# Patient Record
Sex: MALE | Race: WHITE | NOT HISPANIC OR LATINO | Employment: OTHER | URBAN - METROPOLITAN AREA
[De-identification: names, ages, dates, MRNs, and addresses within clinical notes are randomized per-mention and may not be internally consistent; named-entity substitution may affect disease eponyms.]

---

## 2018-09-17 ENCOUNTER — HOSPITAL ENCOUNTER (EMERGENCY)
Facility: HOSPITAL | Age: 33
Discharge: HOME | End: 2018-09-17
Attending: EMERGENCY MEDICINE | Admitting: EMERGENCY MEDICINE

## 2018-09-17 ENCOUNTER — APPOINTMENT (OUTPATIENT)
Dept: CT IMAGING | Facility: HOSPITAL | Age: 33
End: 2018-09-17

## 2018-09-17 VITALS
DIASTOLIC BLOOD PRESSURE: 53 MMHG | SYSTOLIC BLOOD PRESSURE: 92 MMHG | HEIGHT: 68 IN | HEART RATE: 67 BPM | TEMPERATURE: 97.5 F | BODY MASS INDEX: 21.46 KG/M2 | OXYGEN SATURATION: 96 % | WEIGHT: 141.6 LBS | RESPIRATION RATE: 16 BRPM

## 2018-09-17 DIAGNOSIS — R56.9 SEIZURE (HCC): Primary | ICD-10-CM

## 2018-09-17 LAB
ALBUMIN SERPL-MCNC: 3.9 G/DL (ref 3.5–5.2)
ALBUMIN/GLOB SERPL: 1.7 G/DL
ALP SERPL-CCNC: 63 U/L (ref 39–117)
ALT SERPL W P-5'-P-CCNC: 12 U/L (ref 1–41)
ANION GAP SERPL CALCULATED.3IONS-SCNC: 11.5 MMOL/L
AST SERPL-CCNC: 10 U/L (ref 1–40)
BASOPHILS # BLD AUTO: 0.03 10*3/MM3 (ref 0–0.2)
BASOPHILS NFR BLD AUTO: 0.3 % (ref 0–1.5)
BILIRUB SERPL-MCNC: 0.3 MG/DL (ref 0.1–1.2)
BUN BLD-MCNC: 9 MG/DL (ref 6–20)
BUN/CREAT SERPL: 8.5 (ref 7–25)
CALCIUM SPEC-SCNC: 8.2 MG/DL (ref 8.6–10.5)
CHLORIDE SERPL-SCNC: 106 MMOL/L (ref 98–107)
CO2 SERPL-SCNC: 23.5 MMOL/L (ref 22–29)
CREAT BLD-MCNC: 1.06 MG/DL (ref 0.76–1.27)
DEPRECATED RDW RBC AUTO: 37.9 FL (ref 37–54)
EOSINOPHIL # BLD AUTO: 0.24 10*3/MM3 (ref 0–0.7)
EOSINOPHIL NFR BLD AUTO: 2.6 % (ref 0.3–6.2)
ERYTHROCYTE [DISTWIDTH] IN BLOOD BY AUTOMATED COUNT: 12.1 % (ref 11.5–14.5)
GFR SERPL CREATININE-BSD FRML MDRD: 82 ML/MIN/1.73
GLOBULIN UR ELPH-MCNC: 2.3 GM/DL
GLUCOSE BLD-MCNC: 106 MG/DL (ref 65–99)
HCT VFR BLD AUTO: 43.4 % (ref 40.4–52.2)
HGB BLD-MCNC: 14.8 G/DL (ref 13.7–17.6)
IMM GRANULOCYTES # BLD: 0 10*3/MM3 (ref 0–0.03)
IMM GRANULOCYTES NFR BLD: 0 % (ref 0–0.5)
LYMPHOCYTES # BLD AUTO: 2.31 10*3/MM3 (ref 0.9–4.8)
LYMPHOCYTES NFR BLD AUTO: 25 % (ref 19.6–45.3)
MCH RBC QN AUTO: 29.4 PG (ref 27–32.7)
MCHC RBC AUTO-ENTMCNC: 34.1 G/DL (ref 32.6–36.4)
MCV RBC AUTO: 86.1 FL (ref 79.8–96.2)
MONOCYTES # BLD AUTO: 0.54 10*3/MM3 (ref 0.2–1.2)
MONOCYTES NFR BLD AUTO: 5.8 % (ref 5–12)
NEUTROPHILS # BLD AUTO: 6.12 10*3/MM3 (ref 1.9–8.1)
NEUTROPHILS NFR BLD AUTO: 66.3 % (ref 42.7–76)
PLATELET # BLD AUTO: 152 10*3/MM3 (ref 140–500)
PMV BLD AUTO: 9.9 FL (ref 6–12)
POTASSIUM BLD-SCNC: 3.7 MMOL/L (ref 3.5–5.2)
PROT SERPL-MCNC: 6.2 G/DL (ref 6–8.5)
RBC # BLD AUTO: 5.04 10*6/MM3 (ref 4.6–6)
SODIUM BLD-SCNC: 141 MMOL/L (ref 136–145)
WBC NRBC COR # BLD: 9.24 10*3/MM3 (ref 4.5–10.7)

## 2018-09-17 PROCEDURE — 80053 COMPREHEN METABOLIC PANEL: CPT | Performed by: EMERGENCY MEDICINE

## 2018-09-17 PROCEDURE — 96365 THER/PROPH/DIAG IV INF INIT: CPT

## 2018-09-17 PROCEDURE — 85025 COMPLETE CBC W/AUTO DIFF WBC: CPT | Performed by: EMERGENCY MEDICINE

## 2018-09-17 PROCEDURE — 93010 ELECTROCARDIOGRAM REPORT: CPT | Performed by: INTERNAL MEDICINE

## 2018-09-17 PROCEDURE — 25010000003 LEVETIRACETAM IN NACL 0.75% 1000 MG/100ML SOLUTION: Performed by: EMERGENCY MEDICINE

## 2018-09-17 PROCEDURE — 70450 CT HEAD/BRAIN W/O DYE: CPT

## 2018-09-17 PROCEDURE — 99285 EMERGENCY DEPT VISIT HI MDM: CPT

## 2018-09-17 PROCEDURE — 93005 ELECTROCARDIOGRAM TRACING: CPT | Performed by: EMERGENCY MEDICINE

## 2018-09-17 PROCEDURE — 72125 CT NECK SPINE W/O DYE: CPT

## 2018-09-17 RX ORDER — SODIUM CHLORIDE 0.9 % (FLUSH) 0.9 %
10 SYRINGE (ML) INJECTION AS NEEDED
Status: DISCONTINUED | OUTPATIENT
Start: 2018-09-17 | End: 2018-09-17 | Stop reason: HOSPADM

## 2018-09-17 RX ORDER — LEVETIRACETAM 500 MG/1
500 TABLET ORAL 2 TIMES DAILY
Qty: 60 TABLET | Refills: 0 | Status: SHIPPED | OUTPATIENT
Start: 2018-09-17

## 2018-09-17 RX ORDER — LEVETIRACETAM 10 MG/ML
1000 INJECTION INTRAVASCULAR ONCE
Status: COMPLETED | OUTPATIENT
Start: 2018-09-17 | End: 2018-09-17

## 2018-09-17 RX ADMIN — LEVETIRACETAM 1000 MG: 10 INJECTION INTRAVENOUS at 04:28

## 2018-09-17 NOTE — ED PROVIDER NOTES
EMERGENCY DEPARTMENT ENCOUNTER    CHIEF COMPLAINT  Chief Complaint: Seizures   History given by: patient, family  History limited by: n/a  Room Number: 15/15  PMD: Provider, No Known      HPI:  Pt is a 32 y.o. male who presents for evaluation after a seizure tonight. Family states that the heard the pt fall. When they checked on the pt, he was seizing. EMS gave the pt Versed prior to arrival. Family states that the pt has a hx of seizures. Currently, pt complains of neck pain and a headache.      Duration:  Prior to arrival  Onset: sudden  Timing: single episode  Location: neuro  Radiation: n/a  Quality: seizure   Intensity/Severity: moderate  Associated Symptoms: neck pain, headache  Aggravating Factors: none  Alleviating Factors: none  Previous Episodes: hx of seizures   Treatment before arrival: Versed    PAST MEDICAL HISTORY  Active Ambulatory Problems     Diagnosis Date Noted   • No Active Ambulatory Problems     Resolved Ambulatory Problems     Diagnosis Date Noted   • No Resolved Ambulatory Problems     Past Medical History:   Diagnosis Date   • Anxiety    • PTSD (post-traumatic stress disorder)    • Seizures (CMS/HCC)        PAST SURGICAL HISTORY  Past Surgical History:   Procedure Laterality Date   • JOINT REPLACEMENT         FAMILY HISTORY  History reviewed. No pertinent family history.    SOCIAL HISTORY  Social History     Social History   • Marital status:      Spouse name: N/A   • Number of children: N/A   • Years of education: N/A     Occupational History   • Not on file.     Social History Main Topics   • Smoking status: Current Some Day Smoker     Types: Cigars   • Smokeless tobacco: Not on file   • Alcohol use No   • Drug use: No   • Sexual activity: Not on file     Other Topics Concern   • Not on file     Social History Narrative   • No narrative on file       ALLERGIES  Patient has no known allergies.    REVIEW OF SYSTEMS  Review of Systems   Constitutional: Negative for activity change,  appetite change and fever.   HENT: Negative for congestion and sore throat.    Eyes: Negative.    Respiratory: Negative for cough and shortness of breath.    Cardiovascular: Negative for chest pain and leg swelling.   Gastrointestinal: Negative for abdominal pain, diarrhea and vomiting.   Endocrine: Negative.    Genitourinary: Negative for decreased urine volume and dysuria.   Musculoskeletal: Positive for neck pain.   Skin: Negative for rash and wound.   Allergic/Immunologic: Negative.    Neurological: Positive for seizures and headaches. Negative for weakness and numbness.   Hematological: Negative.    Psychiatric/Behavioral: Negative.    All other systems reviewed and are negative.      PHYSICAL EXAM  ED Triage Vitals [09/17/18 0257]   Temp Heart Rate Resp BP SpO2   97.7 °F (36.5 °C) 79 16 110/65 98 %      Temp src Heart Rate Source Patient Position BP Location FiO2 (%)   Oral -- -- -- --       Physical Exam   Constitutional: No distress.   HENT:   Head: Normocephalic and atraumatic.   Eyes: Pupils are equal, round, and reactive to light. EOM are normal.   Neck: Normal range of motion. Neck supple. Spinous process tenderness present.   Cardiovascular: Normal rate, regular rhythm and normal heart sounds.    Pulmonary/Chest: Effort normal and breath sounds normal. No respiratory distress.   Abdominal: Soft. There is no tenderness. There is no rebound and no guarding.   Musculoskeletal: Normal range of motion. He exhibits no edema.   Neurological: He is alert. He has normal sensation and normal strength.   Groggy secondary to post-ictal and benzodiazepines.    Skin: Skin is warm and dry.   Nursing note and vitals reviewed.      LAB RESULTS  Lab Results (last 24 hours)     Procedure Component Value Units Date/Time    CBC & Differential [[id number]] Collected:  09/17/18 0318    Specimen:  Blood Updated:  09/17/18 0333    Narrative:       The following orders were created for panel order CBC & Differential.  Procedure                                Abnormality         Status                     ---------                               -----------         ------                     CBC Auto Differential[[id number]]        Normal              Final result                 Please view results for these tests on the individual orders.    Comprehensive Metabolic Panel [[id number]]  (Abnormal) Collected:  09/17/18 0318    Specimen:  Blood Updated:  09/17/18 0348     Glucose 106 (H) mg/dL      BUN 9 mg/dL      Creatinine 1.06 mg/dL      Sodium 141 mmol/L      Potassium 3.7 mmol/L      Chloride 106 mmol/L      CO2 23.5 mmol/L      Calcium 8.2 (L) mg/dL      Total Protein 6.2 g/dL      Albumin 3.90 g/dL      ALT (SGPT) 12 U/L      AST (SGOT) 10 U/L      Alkaline Phosphatase 63 U/L      Total Bilirubin 0.3 mg/dL      eGFR Non African Amer 82 mL/min/1.73      Globulin 2.3 gm/dL      A/G Ratio 1.7 g/dL      BUN/Creatinine Ratio 8.5     Anion Gap 11.5 mmol/L     CBC Auto Differential [[id number]]  (Normal) Collected:  09/17/18 0318    Specimen:  Blood Updated:  09/17/18 0333     WBC 9.24 10*3/mm3      RBC 5.04 10*6/mm3      Hemoglobin 14.8 g/dL      Hematocrit 43.4 %      MCV 86.1 fL      MCH 29.4 pg      MCHC 34.1 g/dL      RDW 12.1 %      RDW-SD 37.9 fl      MPV 9.9 fL      Platelets 152 10*3/mm3      Neutrophil % 66.3 %      Lymphocyte % 25.0 %      Monocyte % 5.8 %      Eosinophil % 2.6 %      Basophil % 0.3 %      Immature Grans % 0.0 %      Neutrophils, Absolute 6.12 10*3/mm3      Lymphocytes, Absolute 2.31 10*3/mm3      Monocytes, Absolute 0.54 10*3/mm3      Eosinophils, Absolute 0.24 10*3/mm3      Basophils, Absolute 0.03 10*3/mm3      Immature Grans, Absolute 0.00 10*3/mm3           I ordered the above labs and reviewed the results    RADIOLOGY  CT Cervical Spine Without Contrast   Final Result   1. No acute cervical spine abnormality       This report was finalized on 9/17/2018 3:56 AM by Be Spaulding M.D.          CT Head Without  Contrast   Final Result   1. No acute intracranial abnormality.                           This report was finalized on 9/17/2018 3:48 AM by Be Spaulding M.D.               I ordered the above noted radiological studies. Interpreted by radiologist. Reviewed by me in PACS.       PROCEDURES  Procedures    EKG           EKG time: 03;22  Rhythm/Rate: NSR 68  P waves and PA: nml  QRS, axis: nml   ST and T waves: nml    Interpreted Contemporaneously by me, independently viewed  No prior     PROGRESS AND CONSULTS       02:56  CMP, CBC, and UDS ordered.     03:04  BP- 110/65 HR- 79 Temp- 97.7 °F (36.5 °C) (Oral) O2 sat- 98%  Informed pt of the plan for labs and CT head. Pt understands and agrees with the plan, all questions answered.    03:10  EKG, CT head, and CT cervical spine ordered.     04:28  Keppra ordered to prevent further seizures.     06:05  BP- 95/54 HR- 78 Temp- 97.7 °F (36.5 °C) (Oral) O2 sat- 95%  Rechecked the patient who is in NAD and is resting comfortably. Informed pt and family that the CT head and CT cervical spine show NAD. Labs are unremarkable. Informed them of the plan for the pt to start Keppra as an outpatient. Informed them of the plan for d/c, pt is to f/u with his PMD. Pt understands and agrees with the plan, all questions answered.    MEDICAL DECISION MAKING  Results were reviewed/discussed with the patient and they were also made aware of online access. Pt also made aware that some labs, such as cultures, will not be resulted during ER visit and follow up with PMD is necessary.     MDM  Number of Diagnoses or Management Options     Amount and/or Complexity of Data Reviewed  Clinical lab tests: ordered and reviewed (WBC=9.24)  Tests in the radiology section of CPT®: ordered and reviewed (CT head and CT cervical spine show NAD. )  Tests in the medicine section of CPT®: ordered and reviewed (See EKG procedure note. )  Decide to obtain previous medical records or to obtain history from someone  other than the patient: yes  Review and summarize past medical records: yes (No prior ED visits. )    Patient Progress  Patient progress: stable         DIAGNOSIS  Final diagnoses:   Seizure (CMS/HCC)       DISPOSITION  DISCHARGE    Patient discharged in stable condition.    Reviewed implications of results, diagnosis, meds, responsibility to follow up, warning signs and symptoms of possible worsening, potential complications and reasons to return to ER,.    Patient/Family voiced understanding of above instructions.    Discussed plan for discharge, as there is no emergent indication for admission. Patient referred to primary care provider for BP management due to today's BP. Pt/family is agreeable and understands need for follow up and repeat testing.  Pt is aware that discharge does not mean that nothing is wrong but it indicates no emergency is present that requires admission and they must continue care with follow-up as given below or physician of their choice.     FOLLOW-UP  [facility] REFERRAL SERVICE  [place] [address]  [phone]  Schedule an appointment as soon as possible for a visit            Medication List      New Prescriptions    levETIRAcetam 500 MG tablet  Commonly known as:  KEPPRA  Take 1 tablet by mouth 2 (Two) Times a Day.          Latest Documented Vital Signs:  As of 6:57 AM  BP- 92/53 HR- 67 Temp- 97.5 °F (36.4 °C) (Oral) O2 sat- 96%    --  Documentation assistance provided by kimberly Oviedo for Dr Coleman.  Information recorded by the scribe was done at my direction and has been verified and validated by me.     Mag Oviedo  09/17/18 0627       Paul Coleman MD  09/17/18 0657

## 2018-09-17 NOTE — ED NOTES
Got up from sleep and had a seizure.  Last seizure 4 years ago.     Geno Rondon, RN  09/17/18 0253

## 2018-10-18 ENCOUNTER — OFFICE VISIT (OUTPATIENT)
Dept: NEUROLOGY | Facility: CLINIC | Age: 33
End: 2018-10-18

## 2018-10-18 VITALS
OXYGEN SATURATION: 96 % | HEART RATE: 95 BPM | DIASTOLIC BLOOD PRESSURE: 60 MMHG | WEIGHT: 143 LBS | SYSTOLIC BLOOD PRESSURE: 120 MMHG | BODY MASS INDEX: 21.67 KG/M2 | HEIGHT: 68 IN

## 2018-10-18 DIAGNOSIS — R56.9 GENERALIZED CONVULSIVE SEIZURES (HCC): Primary | ICD-10-CM

## 2018-10-18 PROCEDURE — 99204 OFFICE O/P NEW MOD 45 MIN: CPT | Performed by: PSYCHIATRY & NEUROLOGY

## 2018-10-18 NOTE — PROGRESS NOTES
Anshu Tadeo is a 32 y.o. male presents for   Chief Complaint   Patient presents with   • Seizures             CHIEF COMPLAINT:  New onset seziure: referral from ED  History of Present Illness    A 32 YOM, right handed [place] referred from the ED with new onset seizure  He is here alone  History reviewed with him: he does not remember much except what he had been told that he fell to the ground at 2 AM, parents heard the thud and he was on the floor having a generalized seizure. He thinks it lasted about 15 minutes. He did not remember much for a long period of time. He does not know for how long he was confused.  He was brought to ED where blood work and CT brain and c-spine were done and were non remarkable.  He was placed don Keppra 500 mg bid and sen tout to follow with Neurology    Comprehensive review is negative for Seizure risk factors except he served in the [employer] for 9+ years and has had few traumatic events with PTSD  but no known direct head injury  No drug use  No family history  No medications as culprits  No provocative factors to comprehensive review      Neuro review else negative    I confirmed ROS, PMH, SH, FH and medications.  The following portions of the patient's history were reviewed and updated as appropriate: allergies, current medications, past family history, past medical history, past social history, past surgical history and problem list.        Review of Systems   Constitutional: Positive for fatigue. Negative for activity change, appetite change, chills, diaphoresis, fever and unexpected weight change.   HENT: Positive for tinnitus. Negative for congestion, dental problem, drooling, ear discharge, ear pain, facial swelling, hearing loss, mouth sores, nosebleeds, postnasal drip, rhinorrhea, sinus pain, sinus pressure, sneezing, sore throat and trouble swallowing.    Eyes: Positive for photophobia. Negative for pain, discharge, redness, itching and visual disturbance.   Respiratory:  Negative.    Cardiovascular: Negative.    Gastrointestinal: Negative.    Endocrine: Negative.    Genitourinary: Negative.    Musculoskeletal: Negative.    Skin: Negative.    Allergic/Immunologic: Negative.    Neurological: Positive for seizures, facial asymmetry and headaches. Negative for dizziness, tremors, syncope, speech difficulty, weakness, light-headedness and numbness.   Hematological: Negative.    Psychiatric/Behavioral: Negative.          Past Medical History:   Diagnosis Date   • Anxiety    • PTSD (post-traumatic stress disorder)    • Seizures (CMS/HCC)    • Syncope          Social History     Social History   • Marital status:      Spouse name: N/A   • Number of children: N/A   • Years of education: N/A     Occupational History   • Not on file.     Social History Main Topics   • Smoking status: Former Smoker     Types: Cigars   • Smokeless tobacco: Not on file   • Alcohol use No   • Drug use: No   • Sexual activity: Not on file     Other Topics Concern   • Not on file     Social History Narrative   • No narrative on file          Family History   Problem Relation Age of Onset   • Diabetes Mother    • Migraines Mother    • Diabetes Father    • Kidney disease Father    • Dementia Maternal Grandmother            Current Outpatient Prescriptions:   •  HYDROXYZINE HCL PO, Take  by mouth., Disp: , Rfl:   •  levETIRAcetam (KEPPRA) 500 MG tablet, Take 1 tablet by mouth 2 (Two) Times a Day., Disp: 60 tablet, Rfl: 0  •  MIRTAZAPINE PO, Take  by mouth., Disp: , Rfl:   •  PRAZOSIN HCL PO, Take  by mouth., Disp: , Rfl:       Vitals:    10/18/18 1529   BP: 120/60   Pulse: 95   SpO2: 96%         Physical Exam   Constitutional: He is oriented to person, place, and time. He appears well-developed and well-nourished. No distress.   HENT:   Head: Normocephalic.   Mouth/Throat: Oropharynx is clear and moist. No oropharyngeal exudate.   Eyes: Pupils are equal, round, and reactive to light. EOM are normal. Right eye  exhibits no discharge. Left eye exhibits no discharge. No scleral icterus.   Neck: Normal range of motion.   Cardiovascular: Normal rate and regular rhythm.    Pulmonary/Chest: Effort normal and breath sounds normal.   Abdominal: Soft. He exhibits no distension. There is no tenderness.   Neurological: He is oriented to person, place, and time. He has normal strength. He has a normal Finger-Nose-Finger Test, a normal Heel to Shin Test, a normal Romberg Test and a normal Tandem Gait Test. Gait normal.   Reflex Scores:       Tricep reflexes are 1+ on the right side and 1+ on the left side.       Bicep reflexes are 1+ on the right side and 1+ on the left side.       Brachioradialis reflexes are 1+ on the right side and 1+ on the left side.       Patellar reflexes are 1+ on the right side and 1+ on the left side.       Achilles reflexes are 1+ on the right side and 1+ on the left side.  Psychiatric: He has a normal mood and affect. His speech is normal and behavior is normal. Judgment and thought content normal.         Neurologic Exam     Mental Status   Oriented to person, place, and time.   Registration: recalls 3 of 3 objects. Recall at 5 minutes: recalls 3 of 3 objects. Follows 3 step commands.   Attention: normal. Concentration: normal.   Speech: speech is normal   Level of consciousness: alert  Knowledge: consistent with education.   Able to name object. Able to read. Able to repeat. Able to write. Normal comprehension.     Cranial Nerves     CN II   Visual fields full to confrontation.     CN III, IV, VI   Pupils are equal, round, and reactive to light.  Extraocular motions are normal.   Right pupil: Size: 5 mm. Shape: regular. Reactivity: brisk. Consensual response: intact. Accommodation: intact.   Left pupil: Size: 5 mm. Shape: regular. Reactivity: brisk. Consensual response: intact. Accommodation: intact.   CN III: no CN III palsy  CN VI: no CN VI palsy  Nystagmus: none   Diplopia: none  Upgaze:  normal  Downgaze: normal  Conjugate gaze: present    CN V   Facial sensation intact.     CN VII   Facial expression full, symmetric.     CN VIII   CN VIII normal.     CN IX, X   CN IX normal.     CN XI   CN XI normal.     CN XII   CN XII normal.     Motor Exam   Muscle bulk: normal  Overall muscle tone: normal  Right arm pronator drift: absent  Left arm pronator drift: absent  Right leg tone: normal  Left leg tone: normal    Strength   Strength 5/5 throughout.     Sensory Exam   Light touch normal.   Vibration normal.   Proprioception normal.   Pinprick normal.   Graphesthesia: normal  Stereognosis: normal    Gait, Coordination, and Reflexes     Gait  Gait: normal    Coordination   Romberg: negative  Finger to nose coordination: normal  Heel to shin coordination: normal  Tandem walking coordination: normal    Tremor   Resting tremor: absent  Intention tremor: absent  Action tremor: absent    Reflexes   Right brachioradialis: 1+  Left brachioradialis: 1+  Right biceps: 1+  Left biceps: 1+  Right triceps: 1+  Left triceps: 1+  Right patellar: 1+  Left patellar: 1+  Right achilles: 1+  Left achilles: 1+  Right : 1+  Right Freitas: absent  Left Freitas: absent  Right ankle clonus: absent  Left ankle clonus: absent  Right pendular knee jerk: absent  Left pendular knee jerk: absent          Admission on 09/17/2018, Discharged on 09/17/2018   Component Date Value Ref Range Status   • Glucose 09/17/2018 106* 65 - 99 mg/dL Final   • BUN 09/17/2018 9  6 - 20 mg/dL Final   • Creatinine 09/17/2018 1.06  0.76 - 1.27 mg/dL Final   • Sodium 09/17/2018 141  136 - 145 mmol/L Final   • Potassium 09/17/2018 3.7  3.5 - 5.2 mmol/L Final   • Chloride 09/17/2018 106  98 - 107 mmol/L Final   • CO2 09/17/2018 23.5  22.0 - 29.0 mmol/L Final   • Calcium 09/17/2018 8.2* 8.6 - 10.5 mg/dL Final   • Total Protein 09/17/2018 6.2  6.0 - 8.5 g/dL Final   • Albumin 09/17/2018 3.90  3.50 - 5.20 g/dL Final   • ALT (SGPT) 09/17/2018 12  1 - 41 U/L  Final   • AST (SGOT) 09/17/2018 10  1 - 40 U/L Final   • Alkaline Phosphatase 09/17/2018 63  39 - 117 U/L Final   • Total Bilirubin 09/17/2018 0.3  0.1 - 1.2 mg/dL Final   • eGFR Non  Amer 09/17/2018 82  >60 mL/min/1.73 Final   • Globulin 09/17/2018 2.3  gm/dL Final   • A/G Ratio 09/17/2018 1.7  g/dL Final   • BUN/Creatinine Ratio 09/17/2018 8.5  7.0 - 25.0 Final   • Anion Gap 09/17/2018 11.5  mmol/L Final   • WBC 09/17/2018 9.24  4.50 - 10.70 10*3/mm3 Final   • RBC 09/17/2018 5.04  4.60 - 6.00 10*6/mm3 Final   • Hemoglobin 09/17/2018 14.8  13.7 - 17.6 g/dL Final   • Hematocrit 09/17/2018 43.4  40.4 - 52.2 % Final   • MCV 09/17/2018 86.1  79.8 - 96.2 fL Final   • MCH 09/17/2018 29.4  27.0 - 32.7 pg Final   • MCHC 09/17/2018 34.1  32.6 - 36.4 g/dL Final   • RDW 09/17/2018 12.1  11.5 - 14.5 % Final   • RDW-SD 09/17/2018 37.9  37.0 - 54.0 fl Final   • MPV 09/17/2018 9.9  6.0 - 12.0 fL Final   • Platelets 09/17/2018 152  140 - 500 10*3/mm3 Final   • Neutrophil % 09/17/2018 66.3  42.7 - 76.0 % Final   • Lymphocyte % 09/17/2018 25.0  19.6 - 45.3 % Final   • Monocyte % 09/17/2018 5.8  5.0 - 12.0 % Final   • Eosinophil % 09/17/2018 2.6  0.3 - 6.2 % Final   • Basophil % 09/17/2018 0.3  0.0 - 1.5 % Final   • Immature Grans % 09/17/2018 0.0  0.0 - 0.5 % Final   • Neutrophils, Absolute 09/17/2018 6.12  1.90 - 8.10 10*3/mm3 Final   • Lymphocytes, Absolute 09/17/2018 2.31  0.90 - 4.80 10*3/mm3 Final   • Monocytes, Absolute 09/17/2018 0.54  0.20 - 1.20 10*3/mm3 Final   • Eosinophils, Absolute 09/17/2018 0.24  0.00 - 0.70 10*3/mm3 Final   • Basophils, Absolute 09/17/2018 0.03  0.00 - 0.20 10*3/mm3 Final   • Immature Grans, Absolute 09/17/2018 0.00  0.00 - 0.03 10*3/mm3 Final            REVIEW OF STUDIES:  Reviewed reports head CT and c-spine CT and albs from ED    DIAGNOSIS, IMPRESSION AND PLAN:    New onset grand mal seizure w/o provocative factors  Reviewed the Dx  Reviewed DDx of seizures: he ahs none  NO provocative  factors  Reviewed the law and ALL restrictions  Continue Dianna Amado palns on beign cooperative and obedient to the law and no driving for 3 months seizure-free  Needs padmini MRI and EEG and see afterwards.

## 2018-10-31 ENCOUNTER — HOSPITAL ENCOUNTER (OUTPATIENT)
Dept: NEUROLOGY | Facility: HOSPITAL | Age: 33
Discharge: HOME | End: 2018-10-31
Attending: PSYCHIATRY & NEUROLOGY | Admitting: PSYCHIATRY & NEUROLOGY

## 2018-10-31 ENCOUNTER — HOSPITAL ENCOUNTER (OUTPATIENT)
Dept: MRI IMAGING | Facility: HOSPITAL | Age: 33
Discharge: HOME | End: 2018-10-31
Attending: PSYCHIATRY & NEUROLOGY

## 2018-10-31 DIAGNOSIS — R56.9 GENERALIZED CONVULSIVE SEIZURES (HCC): ICD-10-CM

## 2018-10-31 PROCEDURE — 95816 EEG AWAKE AND DROWSY: CPT

## 2018-10-31 PROCEDURE — A9577 INJ MULTIHANCE: HCPCS | Performed by: PSYCHIATRY & NEUROLOGY

## 2018-10-31 PROCEDURE — 70553 MRI BRAIN STEM W/O & W/DYE: CPT

## 2018-10-31 PROCEDURE — 0 GADOBENATE DIMEGLUMINE 529 MG/ML SOLUTION: Performed by: PSYCHIATRY & NEUROLOGY

## 2018-10-31 PROCEDURE — 95816 EEG AWAKE AND DROWSY: CPT | Performed by: PSYCHIATRY & NEUROLOGY

## 2018-10-31 RX ADMIN — GADOBENATE DIMEGLUMINE 13 ML: 529 INJECTION, SOLUTION INTRAVENOUS at 13:23

## 2018-11-01 ENCOUNTER — OFFICE VISIT (OUTPATIENT)
Dept: NEUROLOGY | Facility: CLINIC | Age: 33
End: 2018-11-01

## 2018-11-01 VITALS
HEART RATE: 65 BPM | BODY MASS INDEX: 22.13 KG/M2 | HEIGHT: 68 IN | WEIGHT: 146 LBS | SYSTOLIC BLOOD PRESSURE: 120 MMHG | DIASTOLIC BLOOD PRESSURE: 60 MMHG | OXYGEN SATURATION: 97 %

## 2018-11-01 DIAGNOSIS — R56.9 GENERALIZED CONVULSIVE SEIZURES (HCC): Primary | ICD-10-CM

## 2018-11-01 PROCEDURE — 99213 OFFICE O/P EST LOW 20 MIN: CPT | Performed by: PSYCHIATRY & NEUROLOGY

## 2018-11-01 NOTE — PROGRESS NOTES
"Follow Up for a single unprovoked seizure  No further seizures  He is taking Keppra 500 mg Q12H  No side effects    Interim Neurology review: No changes to report to a detailed review          Vitals:    11/01/18 1307   BP: 120/60   Pulse: 65   SpO2: 97%   Weight: 66.2 kg (146 lb)   Height: 172.7 cm (68\")     Neruo examination was not done    EEG was normal  MRI brain was reviewed by me and there is no pathology to account for the seizure: I concur with official reading      IMPRESSION / PLAN  Single Seizure    I reviewed results with Anshu  I reviewed the AAN recommendations regarding single seizure and reviewed the difference between provoked versus unprovoked seizure  He is a tough  shelly that knows all about sleep deprivation and feels it not to be a factor  By the best we can tell this was unprovoked seizure  We discussed the options of continuing on Keppra versus coming off it and he wants to come off it and take the possible risk of another seizure  Having been fully informed I gave him the plan to cut daily Keppra dose by 250 mg every week until he is off  He will call me if any problems    Total time 15 minutes all in reviewing results and discussing plan/decisions as above        "

## 2021-04-16 ENCOUNTER — BULK ORDERING (OUTPATIENT)
Dept: CASE MANAGEMENT | Facility: OTHER | Age: 36
End: 2021-04-16

## 2021-04-16 DIAGNOSIS — Z23 IMMUNIZATION DUE: ICD-10-CM

## 2023-02-16 ENCOUNTER — TRANSCRIBE ORDERS (OUTPATIENT)
Dept: ADMINISTRATIVE | Age: 38
End: 2023-02-16

## 2023-02-16 DIAGNOSIS — R06.02 SHORTNESS OF BREATH: Primary | ICD-10-CM

## 2023-03-10 ENCOUNTER — HOSPITAL ENCOUNTER (OUTPATIENT)
Dept: PULMONOLOGY | Age: 38
Discharge: HOME | End: 2023-03-10
Payer: COMMERCIAL

## 2023-03-10 ENCOUNTER — HOSPITAL ENCOUNTER (OUTPATIENT)
Age: 38
Discharge: HOME | End: 2023-03-10
Payer: COMMERCIAL

## 2023-03-10 ENCOUNTER — HOSPITAL ENCOUNTER (OUTPATIENT)
Dept: GENERAL RADIOLOGY | Age: 38
Discharge: HOME | End: 2023-03-10
Payer: COMMERCIAL

## 2023-03-10 VITALS — OXYGEN SATURATION: 98 %

## 2023-03-10 DIAGNOSIS — R06.02 SHORTNESS OF BREATH: ICD-10-CM

## 2023-03-10 LAB
DLCO %PRED: 75 %
DLCO PRED: NORMAL
DLCO/VA %PRED: NORMAL
DLCO/VA PRED: NORMAL
DLCO/VA: NORMAL
DLCO: NORMAL
EXPIRATORY TIME-POST: NORMAL
EXPIRATORY TIME: NORMAL
FEF 25-75% %CHNG: NORMAL
FEF 25-75% %PRED-POST: NORMAL
FEF 25-75% %PRED-PRE: NORMAL
FEF 25-75% PRED: NORMAL
FEF 25-75%-POST: NORMAL
FEF 25-75%-PRE: NORMAL
FEV1 %PRED-POST: 101 %
FEV1 %PRED-PRE: 92 %
FEV1 PRED: NORMAL
FEV1-POST: NORMAL
FEV1-PRE: NORMAL
FEV1/FVC %PRED-POST: NORMAL
FEV1/FVC %PRED-PRE: NORMAL
FEV1/FVC PRED: NORMAL
FEV1/FVC-POST: 74 %
FEV1/FVC-PRE: 68 %
FVC %PRED-POST: NORMAL
FVC %PRED-PRE: NORMAL
FVC PRED: NORMAL
FVC-POST: NORMAL
FVC-PRE: NORMAL
GAW %PRED: NORMAL
GAW PRED: NORMAL
GAW: NORMAL
IC %PRED: NORMAL
IC PRED: NORMAL
IC: NORMAL
MEP: NORMAL
MIP: NORMAL
MVV %PRED-PRE: NORMAL
MVV PRED: NORMAL
MVV-PRE: NORMAL
PEF %PRED-POST: NORMAL
PEF %PRED-PRE: NORMAL
PEF PRED: NORMAL
PEF%CHNG: NORMAL
PEF-POST: NORMAL
PEF-PRE: NORMAL
RAW %PRED: NORMAL
RAW PRED: NORMAL
RAW: NORMAL
RV %PRED: NORMAL
RV PRED: NORMAL
RV: NORMAL
SVC %PRED: NORMAL
SVC PRED: NORMAL
SVC: NORMAL
TLC %PRED: 94 %
TLC PRED: NORMAL
TLC: NORMAL
VA %PRED: NORMAL
VA PRED: NORMAL
VA: NORMAL
VTG %PRED: NORMAL
VTG PRED: NORMAL
VTG: NORMAL

## 2023-03-10 PROCEDURE — 94060 EVALUATION OF WHEEZING: CPT

## 2023-03-10 PROCEDURE — 6370000000 HC RX 637 (ALT 250 FOR IP): Performed by: PHYSICAL MEDICINE & REHABILITATION

## 2023-03-10 PROCEDURE — 71046 X-RAY EXAM CHEST 2 VIEWS: CPT

## 2023-03-10 PROCEDURE — 94760 N-INVAS EAR/PLS OXIMETRY 1: CPT

## 2023-03-10 PROCEDURE — 94729 DIFFUSING CAPACITY: CPT

## 2023-03-10 PROCEDURE — 94640 AIRWAY INHALATION TREATMENT: CPT

## 2023-03-10 PROCEDURE — 94726 PLETHYSMOGRAPHY LUNG VOLUMES: CPT

## 2023-03-10 RX ORDER — ALBUTEROL SULFATE 90 UG/1
4 AEROSOL, METERED RESPIRATORY (INHALATION) ONCE
Status: COMPLETED | OUTPATIENT
Start: 2023-03-10 | End: 2023-03-10

## 2023-03-10 RX ADMIN — Medication 4 PUFF: at 08:20

## 2023-03-10 ASSESSMENT — PULMONARY FUNCTION TESTS
FEV1/FVC_POST: 74
FEV1_PERCENT_PREDICTED_POST: 101
FEV1/FVC_PRE: 68
FEV1_PERCENT_PREDICTED_PRE: 92

## 2023-03-11 NOTE — PROCEDURES
[address]                               PULMONARY FUNCTION    PATIENT NAME: Pati Barba                        :        1985  MED REC NO:   [id number]                          ROOM:  ACCOUNT NO:   [de-identified]                           ADMIT DATE: 03/10/2023  PROVIDER:     Danny Damon MD    DATE OF PROCEDURE:  03/10/2023    REQUESTING PHYSICIAN:  Dr. Haroon Diaz _____    INDICATION:  Shortness of breath. FINDINGS:  1. Spirometry: The FVC is 109% of predicted. The FEV1 is 92% of  predicted. The FEV1/FVC ratio is 0.68. There was not a statistically  significant response to bronchodilators. 2.  Plethysmography:  The total lung capacity is 94% of predicted. 3.  The diffusion capacity of carbon dioxide is within normal limits. 4.  The flow volume loop is within normal limits. IMPRESSION:  This patient does not have an obstructive or restrictive  defect and the DLCO is within normal limits. These results do not  explain the patient's symptoms and clinical correlation is recommended.         Connie Veliz MD    D: 2023 15:16:47       T: 2023 15:19:08     [email or web address]  Job#: [id number]     Doc#: [id number]    CC:

## 2024-10-07 ENCOUNTER — HOSPITAL ENCOUNTER (OUTPATIENT)
Dept: MAMMOGRAPHY | Age: 39
Discharge: HOME | End: 2024-10-07
Payer: COMMERCIAL

## 2024-10-07 ENCOUNTER — HOSPITAL ENCOUNTER (OUTPATIENT)
Dept: ULTRASOUND IMAGING | Age: 39
Discharge: HOME | End: 2024-10-07
Payer: COMMERCIAL

## 2024-10-07 DIAGNOSIS — N63.0 MASS OF BREAST, UNSPECIFIED LATERALITY: ICD-10-CM

## 2024-10-07 PROCEDURE — 76642 ULTRASOUND BREAST LIMITED: CPT

## 2024-10-07 PROCEDURE — G0279 TOMOSYNTHESIS, MAMMO: HCPCS

## 2025-08-29 ENCOUNTER — OFFICE VISIT (OUTPATIENT)
Dept: FAMILY MEDICINE CLINIC | Age: 40
End: 2025-08-29
Payer: OTHER GOVERNMENT

## 2025-08-29 VITALS
BODY MASS INDEX: 22.94 KG/M2 | DIASTOLIC BLOOD PRESSURE: 76 MMHG | WEIGHT: 151.4 LBS | HEIGHT: 68 IN | SYSTOLIC BLOOD PRESSURE: 110 MMHG | OXYGEN SATURATION: 97 % | HEART RATE: 102 BPM

## 2025-08-29 DIAGNOSIS — F41.0 PANIC DISORDER: ICD-10-CM

## 2025-08-29 DIAGNOSIS — F43.10 POSTTRAUMATIC STRESS DISORDER: ICD-10-CM

## 2025-08-29 DIAGNOSIS — F34.1 DYSTHYMIA: ICD-10-CM

## 2025-08-29 DIAGNOSIS — F41.1 ANXIETY STATE: ICD-10-CM

## 2025-08-29 DIAGNOSIS — F60.81 NARCISSISTIC PERSONALITY DISORDER (HCC): ICD-10-CM

## 2025-08-29 DIAGNOSIS — Z00.00 WELL ADULT EXAM: Primary | ICD-10-CM

## 2025-08-29 DIAGNOSIS — Z02.1 PHYSICAL EXAM, PRE-EMPLOYMENT: ICD-10-CM

## 2025-08-29 PROCEDURE — 99385 PREV VISIT NEW AGE 18-39: CPT | Performed by: STUDENT IN AN ORGANIZED HEALTH CARE EDUCATION/TRAINING PROGRAM

## 2025-08-29 RX ORDER — MEMANTINE HYDROCHLORIDE 10 MG/1
10 TABLET ORAL 3 TIMES DAILY
COMMUNITY
Start: 2025-04-28

## 2025-08-29 RX ORDER — BUSPIRONE HYDROCHLORIDE 10 MG/1
30 TABLET ORAL
COMMUNITY
Start: 2025-04-28

## 2025-08-29 RX ORDER — ZIPRASIDONE HYDROCHLORIDE 20 MG/1
20 CAPSULE ORAL
COMMUNITY
Start: 2025-04-28

## 2025-08-29 RX ORDER — PRAZOSIN HYDROCHLORIDE 2 MG/1
6 CAPSULE ORAL
COMMUNITY
Start: 2025-04-28

## 2025-08-29 RX ORDER — LAMOTRIGINE 150 MG/1
150 TABLET ORAL
COMMUNITY
Start: 2025-04-28

## 2025-08-29 RX ORDER — GABAPENTIN 600 MG/1
600 TABLET ORAL 3 TIMES DAILY
COMMUNITY

## 2025-08-29 RX ORDER — TEMAZEPAM 30 MG/1
30 CAPSULE ORAL
COMMUNITY
Start: 2025-07-29

## 2025-08-29 RX ORDER — SERTRALINE HYDROCHLORIDE 100 MG/1
100 TABLET, FILM COATED ORAL
COMMUNITY
Start: 2025-04-28

## 2025-08-29 SDOH — ECONOMIC STABILITY: FOOD INSECURITY: WITHIN THE PAST 12 MONTHS, YOU WORRIED THAT YOUR FOOD WOULD RUN OUT BEFORE YOU GOT MONEY TO BUY MORE.: NEVER TRUE

## 2025-08-29 SDOH — ECONOMIC STABILITY: FOOD INSECURITY: WITHIN THE PAST 12 MONTHS, THE FOOD YOU BOUGHT JUST DIDN'T LAST AND YOU DIDN'T HAVE MONEY TO GET MORE.: NEVER TRUE

## 2025-08-29 ASSESSMENT — PATIENT HEALTH QUESTIONNAIRE - PHQ9
SUM OF ALL RESPONSES TO PHQ QUESTIONS 1-9: 0
1. LITTLE INTEREST OR PLEASURE IN DOING THINGS: NOT AT ALL
2. FEELING DOWN, DEPRESSED OR HOPELESS: NOT AT ALL
SUM OF ALL RESPONSES TO PHQ QUESTIONS 1-9: 0